# Patient Record
Sex: FEMALE | Race: BLACK OR AFRICAN AMERICAN | Employment: UNEMPLOYED | URBAN - METROPOLITAN AREA
[De-identification: names, ages, dates, MRNs, and addresses within clinical notes are randomized per-mention and may not be internally consistent; named-entity substitution may affect disease eponyms.]

---

## 2019-04-19 ENCOUNTER — HOSPITAL ENCOUNTER (EMERGENCY)
Facility: CLINIC | Age: 64
Discharge: HOME | End: 2019-04-19
Attending: EMERGENCY MEDICINE | Admitting: EMERGENCY MEDICINE

## 2019-04-19 ENCOUNTER — APPOINTMENT (OUTPATIENT)
Dept: GENERAL RADIOLOGY | Facility: CLINIC | Age: 64
End: 2019-04-19
Attending: EMERGENCY MEDICINE

## 2019-04-19 VITALS
TEMPERATURE: 98.2 F | SYSTOLIC BLOOD PRESSURE: 124 MMHG | DIASTOLIC BLOOD PRESSURE: 83 MMHG | OXYGEN SATURATION: 100 % | WEIGHT: 155.9 LBS | RESPIRATION RATE: 19 BRPM

## 2019-04-19 DIAGNOSIS — R05.9 COUGH: ICD-10-CM

## 2019-04-19 LAB
ALBUMIN SERPL-MCNC: 3.5 G/DL (ref 3.4–5)
ALP SERPL-CCNC: 94 U/L (ref 40–150)
ALT SERPL W P-5'-P-CCNC: 15 U/L (ref 0–50)
ANION GAP SERPL CALCULATED.3IONS-SCNC: 8 MMOL/L (ref 3–14)
AST SERPL W P-5'-P-CCNC: 10 U/L (ref 0–45)
BASOPHILS # BLD AUTO: 0.1 10E9/L (ref 0–0.2)
BASOPHILS NFR BLD AUTO: 0.8 %
BILIRUB SERPL-MCNC: 0.2 MG/DL (ref 0.2–1.3)
BUN SERPL-MCNC: 13 MG/DL (ref 7–30)
CALCIUM SERPL-MCNC: 8.5 MG/DL (ref 8.5–10.1)
CHLORIDE SERPL-SCNC: 106 MMOL/L (ref 94–109)
CO2 BLDCOV-SCNC: 25 MMOL/L (ref 21–28)
CO2 SERPL-SCNC: 24 MMOL/L (ref 20–32)
CREAT SERPL-MCNC: 0.8 MG/DL (ref 0.52–1.04)
DIFFERENTIAL METHOD BLD: NORMAL
EOSINOPHIL # BLD AUTO: 0.3 10E9/L (ref 0–0.7)
EOSINOPHIL NFR BLD AUTO: 3.9 %
ERYTHROCYTE [DISTWIDTH] IN BLOOD BY AUTOMATED COUNT: 13.9 % (ref 10–15)
FLUAV+FLUBV RNA SPEC QL NAA+PROBE: NEGATIVE
FLUAV+FLUBV RNA SPEC QL NAA+PROBE: NEGATIVE
GFR SERPL CREATININE-BSD FRML MDRD: 79 ML/MIN/{1.73_M2}
GLUCOSE SERPL-MCNC: 95 MG/DL (ref 70–99)
HCT VFR BLD AUTO: 42.7 % (ref 35–47)
HGB BLD-MCNC: 13.6 G/DL (ref 11.7–15.7)
IMM GRANULOCYTES # BLD: 0 10E9/L (ref 0–0.4)
IMM GRANULOCYTES NFR BLD: 0.3 %
INR PPP: 1 (ref 0.86–1.14)
LACTATE BLD-SCNC: 0.9 MMOL/L (ref 0.7–2.1)
LYMPHOCYTES # BLD AUTO: 2.6 10E9/L (ref 0.8–5.3)
LYMPHOCYTES NFR BLD AUTO: 33.4 %
MCH RBC QN AUTO: 28.6 PG (ref 26.5–33)
MCHC RBC AUTO-ENTMCNC: 31.9 G/DL (ref 31.5–36.5)
MCV RBC AUTO: 90 FL (ref 78–100)
MONOCYTES # BLD AUTO: 0.6 10E9/L (ref 0–1.3)
MONOCYTES NFR BLD AUTO: 7.5 %
NEUTROPHILS # BLD AUTO: 4.3 10E9/L (ref 1.6–8.3)
NEUTROPHILS NFR BLD AUTO: 54.1 %
NRBC # BLD AUTO: 0 10*3/UL
NRBC BLD AUTO-RTO: 0 /100
NT-PROBNP SERPL-MCNC: 11 PG/ML (ref 0–900)
PCO2 BLDV: 42 MM HG (ref 40–50)
PH BLDV: 7.39 PH (ref 7.32–7.43)
PLATELET # BLD AUTO: 265 10E9/L (ref 150–450)
PO2 BLDV: 68 MM HG (ref 25–47)
POTASSIUM SERPL-SCNC: 4.1 MMOL/L (ref 3.4–5.3)
PROT SERPL-MCNC: 7.4 G/DL (ref 6.8–8.8)
RBC # BLD AUTO: 4.75 10E12/L (ref 3.8–5.2)
RSV RNA SPEC NAA+PROBE: NEGATIVE
SAO2 % BLDV FROM PO2: 93 %
SODIUM SERPL-SCNC: 139 MMOL/L (ref 133–144)
SPECIMEN SOURCE: NORMAL
TROPONIN I SERPL-MCNC: <0.015 UG/L (ref 0–0.04)
WBC # BLD AUTO: 7.9 10E9/L (ref 4–11)

## 2019-04-19 PROCEDURE — 83605 ASSAY OF LACTIC ACID: CPT

## 2019-04-19 PROCEDURE — 83880 ASSAY OF NATRIURETIC PEPTIDE: CPT | Performed by: EMERGENCY MEDICINE

## 2019-04-19 PROCEDURE — 85025 COMPLETE CBC W/AUTO DIFF WBC: CPT | Performed by: EMERGENCY MEDICINE

## 2019-04-19 PROCEDURE — 87631 RESP VIRUS 3-5 TARGETS: CPT | Performed by: EMERGENCY MEDICINE

## 2019-04-19 PROCEDURE — 71046 X-RAY EXAM CHEST 2 VIEWS: CPT

## 2019-04-19 PROCEDURE — 25000125 ZZHC RX 250: Performed by: EMERGENCY MEDICINE

## 2019-04-19 PROCEDURE — 94640 AIRWAY INHALATION TREATMENT: CPT | Performed by: EMERGENCY MEDICINE

## 2019-04-19 PROCEDURE — 25000132 ZZH RX MED GY IP 250 OP 250 PS 637: Performed by: EMERGENCY MEDICINE

## 2019-04-19 PROCEDURE — 99283 EMERGENCY DEPT VISIT LOW MDM: CPT | Mod: Z6 | Performed by: EMERGENCY MEDICINE

## 2019-04-19 PROCEDURE — 84484 ASSAY OF TROPONIN QUANT: CPT | Performed by: EMERGENCY MEDICINE

## 2019-04-19 PROCEDURE — 99283 EMERGENCY DEPT VISIT LOW MDM: CPT | Mod: 25 | Performed by: EMERGENCY MEDICINE

## 2019-04-19 PROCEDURE — 82803 BLOOD GASES ANY COMBINATION: CPT

## 2019-04-19 PROCEDURE — 80053 COMPREHEN METABOLIC PANEL: CPT | Performed by: EMERGENCY MEDICINE

## 2019-04-19 PROCEDURE — 87798 DETECT AGENT NOS DNA AMP: CPT | Performed by: EMERGENCY MEDICINE

## 2019-04-19 PROCEDURE — 85610 PROTHROMBIN TIME: CPT | Performed by: EMERGENCY MEDICINE

## 2019-04-19 RX ORDER — IPRATROPIUM BROMIDE AND ALBUTEROL SULFATE 2.5; .5 MG/3ML; MG/3ML
3 SOLUTION RESPIRATORY (INHALATION) ONCE
Status: COMPLETED | OUTPATIENT
Start: 2019-04-19 | End: 2019-04-19

## 2019-04-19 RX ORDER — ALBUTEROL SULFATE 90 UG/1
2 AEROSOL, METERED RESPIRATORY (INHALATION) ONCE
Status: COMPLETED | OUTPATIENT
Start: 2019-04-19 | End: 2019-04-19

## 2019-04-19 RX ADMIN — IPRATROPIUM BROMIDE AND ALBUTEROL SULFATE 3 ML: .5; 3 SOLUTION RESPIRATORY (INHALATION) at 20:05

## 2019-04-19 RX ADMIN — ALBUTEROL SULFATE 2 PUFF: 90 AEROSOL, METERED RESPIRATORY (INHALATION) at 23:00

## 2019-04-19 SDOH — HEALTH STABILITY: MENTAL HEALTH: HOW OFTEN DO YOU HAVE A DRINK CONTAINING ALCOHOL?: NEVER

## 2019-04-19 NOTE — ED AVS SNAPSHOT
[facility], [place], Emergency Department  [address] [place] [id number]  Phone:  [phone]                                    Claudette Barr   MRN: [id number]    Department:  [facility], Emergency Department   Date of Visit:  4/19/2019           After Visit Summary Signature Page    I have received my discharge instructions, and my questions have been answered. I have discussed any challenges I see with this plan with the nurse or doctor.    ..........................................................................................................................................  Patient/Patient Representative Signature      ..........................................................................................................................................  Patient Representative Print Name and Relationship to Patient    ..................................................               ................................................  Date                                   Time    ..........................................................................................................................................  Reviewed by Signature/Title    ...................................................              ..............................................  Date                                               Time          22EPIC Rev 08/18

## 2019-04-19 NOTE — ED TRIAGE NOTES
Presents with ongoing SOB and cough for 1 month. Patient states she feels SOB when coughing. Patient states cough syrup hasn't been helping.

## 2019-04-20 LAB
B PARAPERT DNA SPEC QL NAA+PROBE: NOT DETECTED
B PERT DNA SPEC QL NAA+PROBE: NOT DETECTED
BORDETELLA COMMENT: NORMAL

## 2019-04-20 ASSESSMENT — ENCOUNTER SYMPTOMS
WHEEZING: 0
ABDOMINAL PAIN: 0
NAUSEA: 0
FREQUENCY: 0
FEVER: 0
DIFFICULTY URINATING: 0
PALPITATIONS: 0
COLOR CHANGE: 0
HEMATURIA: 0
CHEST TIGHTNESS: 1
CONSTIPATION: 0
SHORTNESS OF BREATH: 0
VOMITING: 0
SORE THROAT: 0
COUGH: 1
DIARRHEA: 0
DYSURIA: 0

## 2019-04-20 NOTE — DISCHARGE INSTRUCTIONS
TODAY'S VISIT:  You were seen today for cough.  - The cause of your symptoms is not yet known.   - Because of this, it is very important that you follow-up with primary care (Internal Medicine or Family Medicine) to ensure you continue to improve, and return to the Emergency Department with any new or worsening symptoms.     FOLLOW-UP:  Please make an appointment to follow up with:  - [facility] (phone: [phone]) or [facility] (phone: [phone])     PRESCRIPTIONS / MEDICATIONS:  - You can use the provided albuterol inhaler as needed for cough.   --- You can use 2 puffs, up to 4 times a day, as needed for cough.     RETURN TO THE EMERGENCY DEPARTMENT  Return to the Emergency Department at any time for any concerns, new or worsening symptoms.

## 2019-04-20 NOTE — ED PROVIDER NOTES
[facility] EMERGENCY DEPARTMENT ([facility])  4/19/19   History     Chief Complaint   Patient presents with     Shortness of Breath     Cough     HPI  Claudette Barr is a 63 year old, fully immunized and reportedly healthy female presenting with a 1 month history of progressive cough.     Patient is visiting and will be in town until June. Recent travel to [place] but no known ill contacts. Fully immunized including measles vaccination, etc. Patient reports that she has had a mild cough earlier in the month and this has been doing fairly well. Over the last week or so she feels as though the cough is somewhat worse, dry and nonproductive. No hemoptysis. Occasionally her chest will feel tight when she is coughing but not frankly uncomfortable. No pleuritic pain or palpatations. No chest pressure. No significant shortness of breath or wheezing. No leg pain or swelling. No history of lung disease, heart disease, tuberculosis, etc. She notes she did have whooping cough many many years ago as a child but no recent similar illness. Received all vaccinations. No abdominal pain, no nausea, vomiting, no change in bowel or bladder habits. No significant nasal congestions or other HEENT symptoms including no sore throat. No rashes or skin changes. No measured fevers. No other new symptoms or complaints at this time. Please see ROS for further details.     I have reviewed the Medications, Allergies, Past Medical and Surgical History, and Social History in the Epic system.    This part of the medical record was transcribed by Juanita Louis, Medical Scribe, from a dictation done by Dr. Arguello.   History reviewed. No pertinent past medical history. Patient confirms.     History reviewed. No pertinent surgical history. Patient confirms.     History reviewed. No pertinent family history. Patient confirms.     Social History     Tobacco Use     Smoking status: Never Smoker     Smokeless tobacco: Never Used   Substance  Use Topics     Alcohol use: Never     Frequency: Never       No current facility-administered medications for this encounter.      No current outpatient medications on file.      No Known Allergies     Review of Systems   Constitutional: Negative for fever.   HENT: Negative for congestion, ear discharge, ear pain, sore throat, trouble swallowing and voice change.    Respiratory: Positive for cough and chest tightness. Negative for shortness of breath and wheezing.    Cardiovascular: Negative for chest pain, palpitations and leg swelling.   Gastrointestinal: Negative for abdominal pain, constipation, diarrhea, nausea and vomiting.   Genitourinary: Negative for difficulty urinating, dysuria, frequency, hematuria and urgency.   Musculoskeletal: Negative for arthralgias and myalgias.   Skin: Negative for color change, pallor and rash.   Allergic/Immunologic: Negative for immunocompromised state.   Neurological: Negative for syncope and weakness.   All other systems reviewed and are negative.      Physical Exam   BP: 135/88  Heart Rate: 114  Temp: 98.2  F (36.8  C)  Resp: 19  Weight: 70.7 kg (155 lb 14.4 oz)  SpO2: 98 %      Physical Exam  CONSTITUTIONAL: Well-developed and well-nourished. Awake and alert. Non-toxic appearance. No acute distress.   HENT:   - Head: Normocephalic and atraumatic.   - Ears: Hearing and external ear grossly normal.   - Nose: Nose normal. No rhinorrhea. No epistaxis.   - Mouth/Throat: MMM  EYES: Conjunctivae and lids are normal. No scleral icterus.   NECK: Normal range of motion and phonation normal. Neck supple.  No tracheal deviation, no stridor. No edema or erythema noted.  CARDIOVASCULAR: Normal rate, regular rhythm and no appreciable abnormal heart sounds.  PULMONARY/CHEST: Normal work of breathing. No accessory muscle usage or stridor. No respiratory distress.  No appreciable abnormal breath sounds.  ABDOMEN: Soft, non-distended. No tenderness. No rigidity, rebound or guarding.    MUSCULOSKELETAL: Extremities warm and seemingly well perfused. No edema or calf tenderness.  NEUROLOGIC: Awake, alert. Not disoriented.  Normal tone. No seizure activity. GCS 15  SKIN: Skin is warm and dry. No rash noted. No diaphoresis. No pallor.   PSYCHIATRIC: Normal mood and affect. Speech and behavior normal. Thought processes linear. Cognition and memory are normal.    ED Course     ED Course as of Apr 20 0007 Fri Apr 19, 2019   2220 Influenza A: Negative   2220 Influenza B: Negative   2220 Resp Syncytial Virus: Negative         Assessments & Plan (with Medical Decision Making)   IMPRESSION: 63 year old female generally healthy and fully immunized presenting for a 1 month history of cough somewhat worse over the last week as described further above in the HPI/ROS.   Clinically, patient appears non toxin, NAD. Initial HR in the 110 region but other wise grossly WNL. Normal work of breathing, good air movement throughout without abnormal breaths sounds. No apparent cardiac findings. No fluid overload or findings for DVT. No obvious HENT finding.     Ddx includes, but not limited to, viral illness, secondary bacterial illness, more atypical infections such as whooping cough, pertusses given the prolong nature of her cough. Post nasal drip with chronic allergies that she is not appreciating or seasonal allergies. Patient is not on any medications for any medication related coughs from ace inhibitors. No acid reflux symptoms.      PLAN: laboratory studies including influenza, whooping cough swab. Chest xray, symptom management.     RESULTS:  See ED Course section above for particular pertinent findings and comments  - Labs: No concerning findings on VBG Istat including normal lactate.   Influenza negative  No acute findings on remainder of laboratory studies.  - Imaging: Written preliminary reports reviewed:  --- CXR: No focal PNA    INTERVENTIONS:   - Duoneb  - Albuterol inhaler    RE-EVALUATION:  - The  patient's HR normalized during ED stay.   - Symptoms improved after the nebulizer treatment. Tried albuterol inhaler as well in case this will help for at home.   - Pt otherwise continues to do well here in the ED, no acute issues or apparent concerning changes in vitals or clinical appearance.    DISCUSSIONS:  - w/ Patient: I have reviewed the available findings, plan, need for close follow up, safety and strict return instructions with the patient and her family. They expressed understanding and agreement with this plan. All questions answered to the best of our ability at this time.     DISPOSITION/PLANNING:  - IMPRESSION: Cough  - DISPOSITION: Discharge to home at pt request  --- Follow-up: PCP (provided phone number for out primary care clinics so she can establish primary care here for follow-up from this ED visit).   - RECOMMENDATIONS: Conservative symptom management, strict return instructions  --- Rx: Provided the albuterol inhaler from today.       ______________________________________________________________________     This part of the medical record was transcribed by Juanita Louis, Medical Scribe, from a dictation done by Dr. Arguello.      4/19/2019   [facility], EMERGENCY DEPARTMENT     Aminta Arguello MD  04/23/19 [id number]

## 2019-04-23 ASSESSMENT — ENCOUNTER SYMPTOMS
TROUBLE SWALLOWING: 0
VOICE CHANGE: 0
MYALGIAS: 0
ARTHRALGIAS: 0
WEAKNESS: 0